# Patient Record
Sex: MALE | Race: WHITE
[De-identification: names, ages, dates, MRNs, and addresses within clinical notes are randomized per-mention and may not be internally consistent; named-entity substitution may affect disease eponyms.]

---
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
## 2022-08-15 ENCOUNTER — APPOINTMENT (OUTPATIENT)
Dept: ORTHOPEDIC SURGERY | Facility: CLINIC | Age: 22
End: 2022-08-15

## 2022-08-15 VITALS — BODY MASS INDEX: 25.92 KG/M2 | WEIGHT: 175 LBS | HEIGHT: 69 IN

## 2022-08-15 DIAGNOSIS — M25.561 PAIN IN RIGHT KNEE: ICD-10-CM

## 2022-08-15 PROCEDURE — 73562 X-RAY EXAM OF KNEE 3: CPT | Mod: RT

## 2022-08-15 PROCEDURE — 99214 OFFICE O/P EST MOD 30 MIN: CPT

## 2022-08-15 NOTE — HISTORY OF PRESENT ILLNESS
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 He notes that he was hit by a puck right anterior thigh just prior to the onset of pain. He is unsure if this is related to his knee problems. He describes a burning pain medial aspect of the knee but also experiences pain over the superior and posterior aspects of the knee. He had xrays and an ultrasound last week and was told everything was normal. He reports history of right patella tendonitis, treated in the past with PT with good results. He complains of lesser pain on the medial aspect of the left knee. Denies buckling or locking.

## 2022-08-15 NOTE — DISCUSSION/SUMMARY
[de-identified] : 20 y/o male with right knee pain. \par \par Patient presents for evaluation of right knee pain. The patient's symptoms are mild, and may be overuse in nature.  However, his medial symptoms on the right knee appear to be intense at times and may be consistent with possible meniscal pathology through the medial compartment of the knee with positive provocative meniscal testing as well as joint line tenderness. Discussed with the patient in detail the concern for underlying internal derangement to the meniscus and possible treatment options that may include conservative management (e.g. RICE, activity modification, PT, injection therapy) versus operative arthroscopy. Discussed that treatment would likely depend on the nature of the injury, quality of the chondral surfaces (degree of arthrosis) as seen on MRI imaging.  \par \par Left knee symptoms are relatively mild and nonconcerning.  Would recommend conservative management.\par \par Recommendation: Rest, ice, compression, elevation (RICE) and OTC NSAID's as instructed until MRI imaging.\par \par Follow up after MRI.

## 2022-08-15 NOTE — ADDENDUM
[FreeTextEntry1] : This note was written by Malka Maldonado on 08/15/2022 acting solely as a scribe for Dr. Idris Pan.\par \par All medical record entries made by the Scribe were at my, Dr. Idris Pan, direction and personally dictated by me on 08/15/2022. I have personally reviewed the chart and agree that the record accurately reflects my personal performance of the history, physical exam, assessment and plan.

## 2022-08-15 NOTE — PHYSICAL EXAM
[de-identified] : Oriented to time, place, person\par Mood: Normal\par Affect: Normal\par Appearance: Healthy, well appearing, no acute distress.\par Gait: Normal\par Assistive Devices: None\par \par Right Knee Exam:\par \par Skin: Clean, dry, intact\par Inspection: No obvious malalignment, no masses, no swelling, no effusion\par Pulses: 2+ DP/PT pulses \par ROM: 0-135 degrees of flexion. No pain with deep knee flexion/extension.\par Tenderness: No MJLT. No LJLT. No pain over the patella facets. No pain to the quadriceps tendon. No pain to the patella tendon. No posterior knee tenderness.\par Stability: Stable to varus, valgus. Negative Lachman testing. Negative anterior drawer, negative posterior drawer.\par Strength: 5/5 Q/H/TA/GS/EHL, without atrophy\par Neuro: Intact to light touch throughout, DTRs normal\par Additional Tests: + Roby's test, Negative patellar grind test  [de-identified] : The following radiographs were ordered and read by me during this patients visit. I reviewed each radiograph in detail with the patient and discussed the findings as highlighted below. \par \par 4 views of the bilateral knee were obtained today, 08/15/2022, that show no acute fracture or dislocation. There is no medial, no lateral and no patellofemoral degenerative changes seen. There is no significant malalignment. No significant other obvious osseous abnormality, otherwise unremarkable.

## 2024-07-30 ENCOUNTER — APPOINTMENT (OUTPATIENT)
Dept: ORTHOPEDIC SURGERY | Facility: CLINIC | Age: 24
End: 2024-07-30
Payer: COMMERCIAL

## 2024-07-30 DIAGNOSIS — M79.643 PAIN IN UNSPECIFIED HAND: ICD-10-CM

## 2024-07-30 PROCEDURE — 73110 X-RAY EXAM OF WRIST: CPT | Mod: LT

## 2024-07-30 PROCEDURE — 99203 OFFICE O/P NEW LOW 30 MIN: CPT

## 2024-08-02 NOTE — HISTORY OF PRESENT ILLNESS
[de-identified] : 23M, LHD presents with left hand pain. Patient report injuring hand during a hockey game over 5-6 years ago treated with tiger balm and taping. Recently reinjured hand while working early mid-July 2024. Worsens with pressure, twisting and lifting. Denies further treatment/ imaging.

## 2024-08-02 NOTE — ASSESSMENT
[FreeTextEntry1] : EXAM Left hand with no swelling nor erythema. Able to make a composite fist, oppose thumb to small finger with good thenar bulk, no intrinsic atrophy. +ttp at thenar muscle and thumb CMC. Sensation intact throughout. <2sec cap refill.  Left wrist radiographs demonstrate no fracture nor dislocation. (3-view)  ASSESSMENT & PLAN Left thenar strain - reviewed radiographs and discussed pathoanatomy with patient. Discussed treatment ladder including NSAIDs, bracing, hand therapy.  F/u 6 months

## 2024-08-02 NOTE — HISTORY OF PRESENT ILLNESS
[de-identified] : 23M, LHD presents with left hand pain. Patient report injuring hand during a hockey game over 5-6 years ago treated with tiger balm and taping. Recently reinjured hand while working early mid-July 2024. Worsens with pressure, twisting and lifting. Denies further treatment/ imaging.

## 2024-08-02 NOTE — HISTORY OF PRESENT ILLNESS
[de-identified] : 23M, LHD presents with left hand pain. Patient report injuring hand during a hockey game over 5-6 years ago treated with tiger balm and taping. Recently reinjured hand while working early mid-July 2024. Worsens with pressure, twisting and lifting. Denies further treatment/ imaging.